# Patient Record
Sex: FEMALE | URBAN - METROPOLITAN AREA
[De-identification: names, ages, dates, MRNs, and addresses within clinical notes are randomized per-mention and may not be internally consistent; named-entity substitution may affect disease eponyms.]

---

## 2020-05-18 ENCOUNTER — IMPORTED ENCOUNTER (OUTPATIENT)
Dept: URBAN - METROPOLITAN AREA CLINIC 9 | Facility: CLINIC | Age: 40
End: 2020-05-18

## 2021-07-27 NOTE — PATIENT DISCUSSION
7/27/21: NO EVIDENCE OF WET AMD ON FA. OCT WITH MILD CHANGES, NOTHING SUGGESTIVE OF PROGRESSION TO CNV. MONITOR CLOSELY.

## 2021-10-16 ASSESSMENT — KERATOMETRY
OS_AXISANGLE_DEGREES: 6
OD_K1POWER_DIOPTERS: 44.75
OS_AXISANGLE2_DEGREES: 96
OS_K1POWER_DIOPTERS: 45
OD_AXISANGLE_DEGREES: 173
OS_K2POWER_DIOPTERS: 46.75
OD_AXISANGLE2_DEGREES: 83
OD_K2POWER_DIOPTERS: 46.75

## 2021-10-16 ASSESSMENT — PACHYMETRY
OD_CT_UM: 539.0
OS_CT_UM: 531.0

## 2021-10-16 ASSESSMENT — VISUAL ACUITY
OD_CC: 20/20 SN
OS_CC: 20/60 SN
OS_CC: 20/20 SN
OS_SC: 20/400 SN
OD_SC: 20/400 SN
OD_CC: 20/60 SN

## 2022-01-25 NOTE — PROCEDURE NOTE: CLINICAL
PROCEDURE NOTE: Lucentis 0.5mg PFS OD. Diagnosis: Neovascular AMD with Active CNV. Anesthesia: Lidocaine 4%. Prep: Betadine Flush. Prior to injection, risks/benefits/alternatives discussed including but not limited to infection, loss of vision or eye, hemorrhage, cataract, glaucoma, retinal tears or detachment. The patient wished to proceed with treatment. Topical anesthesia was induced with Alcaine. Additional anesthesia was achieved using drop(s) or injection checked above. A drop of Povidone-iodine 5% ophthalmic solution was instilled over the injection site and in the inferior fornix. Betadine prep was performed. A single use prefilled syringe of intravitreal Lucentis 0.5mg/0.05ml was used and excess was disposed of as waste. The needle was passed 3.0 mm posterior to the limbus in pseudophakic patients, and 3.5 mm posterior to the limbus in phakic patients. Injection Time 1149AM. Patient tolerated the procedure well. There were no complications. The eye was irrigated with sterile irrigating solution. Post procedure instructions given. The patient was instructed to use Artificial Tears q.i.d. p.r.n for comfort. The patient was instructed to return for re-evaluation in approximately 4-12 weeks depending on his/her condition and was told to call immediately if vision decreases and/or if his/her eye becomes red, painful, and/or light sensitive. The patient was instructed to go to the emergency room or call 911 if unable to reach the doctor within an hour or two of trying or calling. Vincent Caldwell

## 2022-03-04 NOTE — PATIENT DISCUSSION
[facility] PLA[facility] OS. Telephone visit:    Patient contacted by telephone today. Telephone contact was initiated to provide care to minimize the patient's exposure potential exposure and or transmission of COVID-19. This visit is being performed via phone to discuss  Symptoms (fatigue) and Telephonic Visit    Clinician Location: [facility]-[address]    Peggy Kendrick is in [place] and her identity has been established. She understands that we are limiting office visits due to the coronavirus pandemic and was informed that consent to treat includes permission to submit charges to her insurance. It was also shared that without being seen and evaluated in person, there is a risk that the information and/or assessment may be incomplete or inaccurate. 11-20 minutes were spent in this encounter. Patient contacted for a telephone visit. Concerns:    GI Issues- Pt states she has been urinating and having diarrhea. States reports being incontinent of stool and urine. Denies bloody stools. She was referred to GI for same issues in January but she does not have an appt set up. Also reports fever and increased back pain. She is extremely worried about the status of her kidneys. She states she has tried to get her labs done but could not make appt anywhere due to her fever. Anxiety/depression- reports feeling worse on increased dose of effexor. She thinks this has increased phlegm after increasing her dose. She reports she is still having depression and anxiety. She will start meeting w/ a counselor this week. She denies current SI/HI but has had thoughts In past. She does not have a plan to hurt herself. She reports increased anxiety due to her worsening medical issues and is very worried something is wrong w/ her kidney    She denies chest pain, nausea or vomiting. Denies SOB.      Current Outpatient Medications   Medication Sig   â¢ venlafaxine XR (EFFEXOR XR) 37.5 MG 24 hr capsule Take 1 capsule by mouth daily.   â¢ benzonatate (TESSALON PERLES) 100 MG capsule Take 1 capsule by mouth 3 times daily as needed for Cough. â¢ Psyllium (Metamucil) 48.57 % Powder Take 1 capful with 8oz of fluid daily   â¢ busPIRone (BUSPAR) 10 MG tablet Take 1 tablet by mouth 2 times daily. â¢ amLODIPine (NORVASC) 5 MG tablet Take 1 tablet by mouth daily. â¢ methylPREDNISolone (MEDROL DOSEPAK) 4 MG tablet follow package directions   â¢ butalbital-APAP-caffeine (FIORICET) -40 MG per tablet Take 1 tablet by mouth every 12 hours as needed for Headaches. â¢ Dextromethorphan-guaiFENesin (DELSYM COUGH/CHEST CONGEST DM PO)    â¢ albuterol 108 (90 Base) MCG/ACT inhaler Inhale 2 puffs into the lungs every 4 hours as needed for Shortness of Breath or Wheezing. No current facility-administered medications for this visit. ALLERGIES:   Allergen Reactions   â¢ Escitalopram THROAT SWELLING   â¢ Sertraline THROAT SWELLING   â¢ Eggs [Egg] Nausea & Vomiting   â¢ Gabapentin Other (See Comments)     Mood changes   â¢ Latex RASH   â¢ Oranges [Orange] RASH   â¢ Pummelo Peel HIVES   â¢ Tomato HIVES        Past medical history, social history, family history reviewed, noted in EMR. Assessment/Plan:    Allen Freeman was seen today for gu symptoms and telephonic visit. Diagnoses and all orders for this visit:    Urinary incontinence, unspecified type  Comments:  - Pt reports incontinence of feces and urine, fever and back pain  -Advised ED for urgent evaluation given her symptoms, she agrees to go [facility] today  [facility] given     Incontinence of feces, unspecified fecal incontinence type  Comments:  - See plan above  - consider stool studies in future if sxs persist, she woudl also likely beenfit from GI consult if she needs possible scope (referral was already placed for pt).      Major depressive disorder, recurrent episode, moderate (CMS/HCC)  Comments:  -Reports ongoing anxiety and depression, experiencing SEs w/ higher dose of effexor  - She likely has increased anxiety and depression due to her current medical condition and concern for her kidneys, she was advised ER evaluation today (see plan above)   - Decrease effexor to 37.5mg daily due to pt reports SEs w/ increased dose (increased phlegm), continue buspar 10mg BID  -Pt states she is to start therapy with a counselor soon, encouraged to attend sessions regularly   -Advised to schedule f/u w/ psychiatrist via [facility] as she has failed several medical therapies already and would benefit from psych input re: other therapies. Provided # to [facility] scheduling line. Orders:  -     venlafaxine XR (EFFEXOR XR) 37.5 MG 24 hr capsule; Take 1 capsule by mouth daily. 11-20 (18) minutes were spent in this encounter. Lou Vazquez NP   [address] [address] [address] [place] [address],[address]  [facility]

## 2022-03-04 NOTE — PROCEDURE NOTE: CLINICAL

## 2022-04-08 NOTE — PROCEDURE NOTE: CLINICAL
PROCEDURE NOTE: Lucentis 0.5mg PFS OD. Diagnosis: Neovascular AMD with Active CNV. Anesthesia: Lidocaine 4%. Prep: Betadine Flush. Prior to injection, risks/benefits/alternatives discussed including but not limited to infection, loss of vision or eye, hemorrhage, cataract, glaucoma, retinal tears or detachment. The patient wished to proceed with treatment. Topical anesthesia was induced with Alcaine. Additional anesthesia was achieved using drop(s) or injection checked above. A drop of Povidone-iodine 5% ophthalmic solution was instilled over the injection site and in the inferior fornix. Betadine prep was performed. A single use prefilled syringe of intravitreal Lucentis 0.5mg/0.05ml was used and excess was disposed of as waste. The needle was passed 3.0 mm posterior to the limbus in pseudophakic patients, and 3.5 mm posterior to the limbus in phakic patients. Injection Time *. Patient tolerated the procedure well. There were no complications. The eye was irrigated with sterile irrigating solution. Post procedure instructions given. The patient was instructed to use Artificial Tears q.i.d. p.r.n for comfort. The patient was instructed to return for re-evaluation in approximately 4-12 weeks depending on his/her condition and was told to call immediately if vision decreases and/or if his/her eye becomes red, painful, and/or light sensitive. The patient was instructed to go to the emergency room or call 911 if unable to reach the doctor within an hour or two of trying or calling. Shwetha Hinton

## 2022-05-13 NOTE — PROCEDURE NOTE: CLINICAL

## 2022-06-17 NOTE — PROCEDURE NOTE: CLINICAL

## 2022-07-22 NOTE — PROCEDURE NOTE: CLINICAL
PROCEDURE NOTE: Lucentis 0.5mg PFS OD. Diagnosis: Neovascular AMD with Active CNV. Anesthesia: Lidocaine 4%. Prep: Betadine Flush. Prior to injection, risks/benefits/alternatives discussed including but not limited to infection, loss of vision or eye, hemorrhage, cataract, glaucoma, retinal tears or detachment. The patient wished to proceed with treatment. Topical anesthesia was induced with Alcaine. Additional anesthesia was achieved using drop(s) or injection checked above. A drop of Povidone-iodine 5% ophthalmic solution was instilled over the injection site and in the inferior fornix. Betadine prep was performed. A single use prefilled syringe of intravitreal Lucentis 0.5mg/0.05ml was used and excess was disposed of as waste. The needle was passed 3.0 mm posterior to the limbus in pseudophakic patients, and 3.5 mm posterior to the limbus in phakic patients. Injection Time 1:28PM. Patient tolerated the procedure well. There were no complications. The eye was irrigated with sterile irrigating solution. Post procedure instructions given. The patient was instructed to use Artificial Tears q.i.d. p.r.n for comfort. The patient was instructed to return for re-evaluation in approximately 4-12 weeks depending on his/her condition and was told to call immediately if vision decreases and/or if his/her eye becomes red, painful, and/or light sensitive. The patient was instructed to go to the emergency room or call 911 if unable to reach the doctor within an hour or two of trying or calling. Veronica Macario

## 2022-08-26 NOTE — PROCEDURE NOTE: CLINICAL
PROCEDURE NOTE: Lucentis 0.5mg PFS OD. Diagnosis: Neovascular AMD with Active CNV. Anesthesia: Lidocaine 4%. Prep: Betadine Flush. Prior to injection, risks/benefits/alternatives discussed including but not limited to infection, loss of vision or eye, hemorrhage, cataract, glaucoma, retinal tears or detachment. The patient wished to proceed with treatment. Topical anesthesia was induced with Alcaine. Additional anesthesia was achieved using drop(s) or injection checked above. A drop of Povidone-iodine 5% ophthalmic solution was instilled over the injection site and in the inferior fornix. Betadine prep was performed. A single use prefilled syringe of intravitreal Lucentis 0.5mg/0.05ml was used and excess was disposed of as waste. The needle was passed 3.0 mm posterior to the limbus in pseudophakic patients, and 3.5 mm posterior to the limbus in phakic patients. Injection Time 1:40PM. Patient tolerated the procedure well. There were no complications. The eye was irrigated with sterile irrigating solution. Post procedure instructions given. The patient was instructed to use Artificial Tears q.i.d. p.r.n for comfort. The patient was instructed to return for re-evaluation in approximately 4-12 weeks depending on his/her condition and was told to call immediately if vision decreases and/or if his/her eye becomes red, painful, and/or light sensitive. The patient was instructed to go to the emergency room or call 911 if unable to reach the doctor within an hour or two of trying or calling. Anisa Lou

## 2022-10-21 NOTE — PROCEDURE NOTE: CLINICAL
PROCEDURE NOTE: Lucentis 0.5mg PFS OD. Diagnosis: Neovascular AMD with Active CNV. Anesthesia: Lidocaine 4%. Prep: Betadine Flush. Prior to injection, risks/benefits/alternatives discussed including but not limited to infection, loss of vision or eye, hemorrhage, cataract, glaucoma, retinal tears or detachment. The patient wished to proceed with treatment. Topical anesthesia was induced with Alcaine. Additional anesthesia was achieved using drop(s) or injection checked above. A drop of Povidone-iodine 5% ophthalmic solution was instilled over the injection site and in the inferior fornix. Betadine prep was performed. A single use prefilled syringe of intravitreal Lucentis 0.5mg/0.05ml was used and excess was disposed of as waste. The needle was passed 3.0 mm posterior to the limbus in pseudophakic patients, and 3.5 mm posterior to the limbus in phakic patients. Injection Time 2:45PM. Patient tolerated the procedure well. There were no complications. The eye was irrigated with sterile irrigating solution. Post procedure instructions given. The patient was instructed to use Artificial Tears q.i.d. p.r.n for comfort. The patient was instructed to return for re-evaluation in approximately 4-12 weeks depending on his/her condition and was told to call immediately if vision decreases and/or if his/her eye becomes red, painful, and/or light sensitive. The patient was instructed to go to the emergency room or call 911 if unable to reach the doctor within an hour or two of trying or calling. Dashawn Bain

## 2022-12-16 NOTE — PROCEDURE NOTE: CLINICAL

## 2023-11-01 NOTE — PATIENT DISCUSSION
1/25/22: NEW EVIDENCE OF SRF PARAFOVEALLY WITH LEAKAGE ON FA. WE'LL CONT TO MONITOR.
1/25/22: STABLE.
10/24/18, PT WAS SEEN IN 2017 HAD BLOCKAGE LEFT SIDE POST ENDARTERECTOMY AND BYPASS.
7/27/21: NO EVIDENCE OF WET AMD ON FA. OCT WITH MILD CHANGES, NOTHING SUGGESTIVE OF PROGRESSION TO CNV. MONITOR CLOSELY.
7/27/21: RESOLVED.
ASTEROID HYALOSIS appears VISUALLY SIGNIFICANT. PT TOLERATES IT.
BMI above normal limits, recommended weight loss, improve diet and follow up with internist.
Based on today's exam, diagnostic studies, and/or review of records, the determination was made for treatment today. LUCENTIS 0.5mg recommended TODAY after discussion of benefits, risks and alternatives. The injection was given and tolerated well by the patient. Post-injection instructions were reviewed and understood by the patient. Procedure was performed without complications. Instructed to call immediately if any new distortion, blurring, decreased vision or eye pain.
Good postoperative appearance.
HAS STOPPED PLAQUENIL, NO WORSENING SINCE LAST VISIT.
[facility].
My findings and recommendations TODAY are based on the patient's symptoms, exam, diagnostic testing and records.
No RECURRENT ERM.
No retinal tears or retinal detachment seen on clinical exam today. Reviewed the signs and symptoms of retinal tear/retinal detachment and the importance of calling for prompt evaluation should there be increasing floaters, new flashing lights, or decreasing peripheral vision in either eye at any time. Observation recommended.
RECOMMENDED OBSERVATION.
Recommend MONITORING for RECURRENCE of ERM.
Recommended weight and BMI control through healthy diet and exercise, green leafy veggies, UV protection, and not smoking. Reviewed the benefits of AREDS II VITAMINS VERUS GENOTYPE directed vitamin therapy, and recommended following one or the other to try and prevent the progression of the disease. Reviewed the importance of daily monitoring of the vision in each eye independently, along with the use of the Amsler grid daily and instructed patient to call and return immediately for any new changes in their vision or on the Amsler grid. Patient instructed on the importance of regular follow up and monitoring for the early detection of conversion to wet AMD as early detection results in early treatment and better outcomes.
The patient is at high risk for a choroidal neovascular membrane. NO CNV SEEN TODAY. Dry ARMD is responsible for some decrease in vision.
Use artificial tears to affected eye(s) as needed.
2022